# Patient Record
Sex: MALE | Race: BLACK OR AFRICAN AMERICAN | NOT HISPANIC OR LATINO | Employment: STUDENT | URBAN - METROPOLITAN AREA
[De-identification: names, ages, dates, MRNs, and addresses within clinical notes are randomized per-mention and may not be internally consistent; named-entity substitution may affect disease eponyms.]

---
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
## 2024-04-02 ENCOUNTER — OFFICE VISIT (OUTPATIENT)
Dept: FAMILY MEDICINE | Facility: CLINIC | Age: 24
End: 2024-04-02
Payer: COMMERCIAL

## 2024-04-02 VITALS
BODY MASS INDEX: 39.9 KG/M2 | DIASTOLIC BLOOD PRESSURE: 78 MMHG | RESPIRATION RATE: 20 BRPM | HEIGHT: 69 IN | WEIGHT: 269.38 LBS | HEART RATE: 90 BPM | SYSTOLIC BLOOD PRESSURE: 132 MMHG | OXYGEN SATURATION: 98 %

## 2024-04-02 DIAGNOSIS — R53.83 FATIGUE, UNSPECIFIED TYPE: ICD-10-CM

## 2024-04-02 DIAGNOSIS — L02.92 FURUNCLE: ICD-10-CM

## 2024-04-02 DIAGNOSIS — R41.840 ATTENTION DEFICIT: ICD-10-CM

## 2024-04-02 DIAGNOSIS — Z00.00 WELLNESS EXAMINATION: Primary | ICD-10-CM

## 2024-04-02 DIAGNOSIS — L21.9 SEBORRHEIC DERMATITIS: ICD-10-CM

## 2024-04-02 PROCEDURE — 3008F BODY MASS INDEX DOCD: CPT | Mod: CPTII,S$GLB,, | Performed by: FAMILY MEDICINE

## 2024-04-02 PROCEDURE — 1159F MED LIST DOCD IN RCRD: CPT | Mod: CPTII,S$GLB,, | Performed by: FAMILY MEDICINE

## 2024-04-02 PROCEDURE — 90715 TDAP VACCINE 7 YRS/> IM: CPT | Mod: S$GLB,,, | Performed by: FAMILY MEDICINE

## 2024-04-02 PROCEDURE — 99999 PR PBB SHADOW E&M-EST. PATIENT-LVL IV: CPT | Mod: PBBFAC,,, | Performed by: FAMILY MEDICINE

## 2024-04-02 PROCEDURE — 99385 PREV VISIT NEW AGE 18-39: CPT | Mod: 25,S$GLB,, | Performed by: FAMILY MEDICINE

## 2024-04-02 PROCEDURE — 3078F DIAST BP <80 MM HG: CPT | Mod: CPTII,S$GLB,, | Performed by: FAMILY MEDICINE

## 2024-04-02 PROCEDURE — 3075F SYST BP GE 130 - 139MM HG: CPT | Mod: CPTII,S$GLB,, | Performed by: FAMILY MEDICINE

## 2024-04-02 PROCEDURE — 90471 IMMUNIZATION ADMIN: CPT | Mod: S$GLB,,, | Performed by: FAMILY MEDICINE

## 2024-04-02 RX ORDER — ONDANSETRON 4 MG/1
4 TABLET, ORALLY DISINTEGRATING ORAL
COMMUNITY
Start: 2024-02-26 | End: 2024-04-02

## 2024-04-02 RX ORDER — MUPIROCIN 20 MG/G
OINTMENT TOPICAL 3 TIMES DAILY
Qty: 30 G | Refills: 2 | Status: SHIPPED | OUTPATIENT
Start: 2024-04-02

## 2024-04-02 RX ORDER — KETOCONAZOLE 20 MG/ML
SHAMPOO, SUSPENSION TOPICAL
Qty: 120 ML | Refills: 11 | Status: SHIPPED | OUTPATIENT
Start: 2024-04-04

## 2024-04-02 NOTE — PROGRESS NOTES
THIS DOCUMENT WAS MADE IN PART WITH VOICE RECOGNITION SOFTWARE.  OCCASIONALLY THIS SOFTWARE WILL MISINTERPRET WORDS OR PHRASES.    Assessment and Plan:    1. Wellness examination  CBC Auto Differential    Comprehensive Metabolic Panel    Lipid Panel    Hemoglobin A1C    TSH    T4, Free    HIV 1/2 Ag/Ab (4th Gen)    Hepatitis C Antibody    Urinalysis    Urinalysis Microscopic    (In Office Administered) Tdap Vaccine      2. Fatigue, unspecified type  Testosterone    Vitamin B12    Methylmalonic Acid, Serum    Sedimentation rate    MARCELLA Screen w/Reflex      3. Attention deficit  Ambulatory referral/consult to Psychology      4. Seborrheic dermatitis  ketoconazole (NIZORAL) 2 % shampoo      5. Furuncle  mupirocin (BACTROBAN) 2 % ointment          Wellness labs as above     Check labs for fatigue as above   Patient under lot of stress with school projects  Consider sleep study if no positive results on his fatigue labs    Psychology referral for testing for ADHD     Ketoconazole shampoo for seborrheic dermatitis     Recommended hot compress, topical mupirocin for small unimpressive furuncle, follow-up if needs I and D or symptoms worsen    ______________________________________________________________________  Subjective:    Chief Complaint:  Chief Complaint   Patient presents with    Annual Exam     Just a physical, but not establishing care??        HPI:  Carlos Eduardo is a 24 y.o. year old     Patient presents today for establish care/physical     Complains of fatigue for many weeks   Reports being under lot of stress at school.  Denies any significant anxiety or depression   Reports getting plenty of sleep and minimal snoring     Also interested in evaluation for ADD.  His girlfriend reports he has trouble focusing in has some features of ADD.  No prior diagnosis     Noted to have flaking and itching in his scalp.  Has tried over-the-counter products with no significant improvement in symptoms    Complains of left thigh  bump.  Nontender.  No drainage          Past Medical History:  Past Medical History:   Diagnosis Date    Allergy        Past Surgical History:  No past surgical history on file.    Family History:  Family History   Problem Relation Age of Onset    Depression Mother     Diabetes Maternal Grandmother     Hypertension Maternal Grandmother     Early death Paternal Grandfather     Heart disease Paternal Grandfather     Hypertension Paternal Grandfather        Social History:  Social History     Socioeconomic History    Marital status: Single    Number of children: 0   Tobacco Use    Smoking status: Never    Smokeless tobacco: Never   Substance and Sexual Activity    Alcohol use: Yes     Alcohol/week: 1.0 standard drink of alcohol     Types: 1 Glasses of wine per week    Drug use: Never    Sexual activity: Yes     Partners: Female     Birth control/protection: Condom, Other-see comments     Comment: Birth Control Pills   Social History Narrative    School : [employer]     Exercise : None    Diet : normal      Social Determinants of Health     Financial Resource Strain: Low Risk  (4/1/2024)    Overall Financial Resource Strain (CARDIA)     Difficulty of Paying Living Expenses: Not hard at all   Food Insecurity: No Food Insecurity (4/1/2024)    Hunger Vital Sign     Worried About Running Out of Food in the Last Year: Never true     Ran Out of Food in the Last Year: Never true   Transportation Needs: No Transportation Needs (4/1/2024)    PRAPARE - Transportation     Lack of Transportation (Medical): No     Lack of Transportation (Non-Medical): No   Physical Activity: Insufficiently Active (4/1/2024)    Exercise Vital Sign     Days of Exercise per Week: 4 days     Minutes of Exercise per Session: 20 min   Stress: Stress Concern Present (4/1/2024)    [employer] - Occupational Stress Questionnaire     Feeling of Stress : To some extent   Social Connections: Unknown (4/1/2024)    Social  Connection and Isolation Panel [NHANES]     Frequency of Communication with Friends and Family: More than three times a week     Frequency of Social Gatherings with Friends and Family: Once a week     Active Member of Clubs or Organizations: No     Attends Club or Organization Meetings: Never     Marital Status: Living with partner   Housing Stability: Low Risk  (4/1/2024)    Housing Stability Vital Sign     Unable to Pay for Housing in the Last Year: No     Number of Places Lived in the Last Year: 2     Unstable Housing in the Last Year: No       Medications:  Current Outpatient Medications on File Prior to Visit   Medication Sig Dispense Refill    [DISCONTINUED] ondansetron (ZOFRAN-ODT) 4 MG TbDL Take 4 mg by mouth.      cetirizine (ZYRTEC) 10 MG tablet Take 10 mg by mouth once daily.      fexofenadine (ALLEGRA) 180 MG tablet Take 180 mg by mouth once daily.      [DISCONTINUED] diphenhydrAMINE (BENADRYL) spray Apply topically every 4 (four) hours as needed for Itching.      [DISCONTINUED] fexofenadine (ALLEGRA) 180 MG tablet Take 180 mg by mouth once daily.      [DISCONTINUED] montelukast (SINGULAIR) 10 mg tablet Take 10 mg by mouth every evening.       No current facility-administered medications on file prior to visit.       Allergies:  Patient has no known allergies.    Immunizations:  Immunization History   Administered Date(s) Administered    COVID-19, MRNA, LN-S, PF (Pfizer) (Purple Cap) 03/28/2021, 04/18/2021, 10/22/2021    COVID-19, mRNA, LNP-S, bivalent booster, PF (PFIZER OMICRON) 04/14/2023    DTaP 2002, 2002, 2002, 12/09/2003, 05/11/2006    HPV Quadrivalent 02/01/2013, 04/29/2013, 08/07/2013    Hepatitis A, Pediatric/Adolescent, 2 Dose 02/01/2013, 08/07/2013    Hepatitis B 2002, 2002, 2002    Hib-HbOC 2002, 2002, 2002, 05/09/2003    IPV 2002, 2002, 2002, 05/11/2006    Influenza - Intranasal 11/02/2010    Influenza - Quadrivalent  "01/17/2014    Influenza - Quadrivalent - MDCK - PF 09/14/2017    Influenza - Quadrivalent - PF *Preferred* (6 months and older) 10/10/2014, 09/01/2018, 09/14/2019, 10/17/2020, 10/22/2021, 03/03/2023    Influenza - Trivalent (PED) 12/19/2006, 11/08/2007, 11/26/2007, 11/04/2009    Influenza A (H1N1) 2009 Monovalent - IM - PF 11/04/2009, 12/29/2009    MMR 02/19/2003, 05/11/2006    Meningococcal B, OMV 04/19/2018, 05/30/2018    Meningococcal Conjugate (MCV4P) 02/01/2013, 04/19/2018    Pneumococcal Conjugate - 7 Valent 2002, 2002, 2002, 05/09/2003    Tdap 02/01/2013    Varicella 02/19/2003, 03/24/2008       Review of Systems:  Review of Systems   Constitutional:  Negative for fever.   Respiratory:  Negative for cough and shortness of breath.    Cardiovascular:  Negative for chest pain.   Gastrointestinal:  Negative for abdominal pain, diarrhea, nausea and vomiting.   Skin:  Negative for rash.   Psychiatric/Behavioral:  Negative for dysphoric mood.        Objective:    Vitals:  Vitals:    04/02/24 1424   BP: 132/78   Pulse: 90   Resp: 20   SpO2: 98%   Weight: 122.2 kg (269 lb 6.4 oz)   Height: 5' 9" (1.753 m)   PainSc: 0-No pain       Physical Exam  Constitutional:       General: He is not in acute distress.  HENT:      Head: Normocephalic and atraumatic.   Eyes:      Pupils: Pupils are equal, round, and reactive to light.   Cardiovascular:      Rate and Rhythm: Normal rate and regular rhythm.      Heart sounds: No murmur heard.     No friction rub.   Pulmonary:      Effort: Pulmonary effort is normal.      Breath sounds: Normal breath sounds.   Abdominal:      General: Bowel sounds are normal. There is no distension.      Palpations: Abdomen is soft.      Tenderness: There is no abdominal tenderness.   Musculoskeletal:      Cervical back: Neck supple.   Skin:     General: Skin is warm and dry.      Findings: No rash.             Comments: Left thigh-small furuncle, no fluctuance.   Psychiatric:         " Behavior: Behavior normal.             Harry Zuleta MD  Family Medicine

## 2024-04-12 ENCOUNTER — OFFICE VISIT (OUTPATIENT)
Dept: PSYCHIATRY | Facility: CLINIC | Age: 24
End: 2024-04-12
Payer: COMMERCIAL

## 2024-04-12 ENCOUNTER — PATIENT MESSAGE (OUTPATIENT)
Dept: PSYCHIATRY | Facility: CLINIC | Age: 24
End: 2024-04-12
Payer: COMMERCIAL

## 2024-04-12 DIAGNOSIS — Z13.39 ADHD (ATTENTION DEFICIT HYPERACTIVITY DISORDER) EVALUATION: Primary | ICD-10-CM

## 2024-04-12 DIAGNOSIS — R41.840 ATTENTION DEFICIT: ICD-10-CM

## 2024-04-12 PROCEDURE — 90792 PSYCH DIAG EVAL W/MED SRVCS: CPT | Mod: 95,,, | Performed by: PSYCHOLOGIST

## 2024-04-12 NOTE — PROGRESS NOTES
The patient location is: [place], [place]  The chief complaint leading to consultation is: ADHD evaluation  Visit type: Virtual visit with synchronous audio and video  Each patient to whom he or she provides medical services by telemedicine is:  (1) informed of the relationship between the physician and patient and the respective role of any other health care provider with respect to management of the patient; and (2) notified that he or she may decline to receive medical services by telemedicine and may withdraw from such care at any time.  Face-to-Face time: 31 minutes    Notes:      Outpatient Psychiatric Initial Visit  04/12/2024     ID:   Patient presents for an initial evaluation.      Reason for encounter: Referral from Dr. Zuleta     Chief Complaint: ADHD evaluation    History of Presenting Illness:  Pt described an goodc childhood raised by biological mother and maternal grandmother. Pt said that the household was happy and pt was well cared for. Pt denied any trauma or abuse in or outside of the household. Pt denied any mental health issues in childhood and said that he had good friends and did well in school. Pt does recall early difficulties with memory and noted some difficulty with inattention in HS.    Pt went to [employer] for two years and then transferred to [employer]. Pt is still a full-time student and is getting mostly A's and B's. Pt's girlfriend urges him to be evaluated for ADHD. Pt does endorse some symptoms.    Depression symptoms: pt denied current or hx of symptoms     Anxiety symptoms:  Pt denied symptoms consistent with KELLEE, OCD, panic, phobias, or social anxiety.     Nhung/Hypomania Symptoms: Pt denied current or history of related symptoms.    Psychosis Symptoms: Pt denied current or history of related symptoms.    Attention/Concentration Symptoms: Pt reports attention/concentration concerns. Pt explained that some of his symptoms were present before the age of 14 and are  cause impairment in more than one setting. Pt endorsed the following symptoms:    Inattentive:  Often has trouble holding attention on tasks or play activities.  Often does not seem to listen when spoken to directly.  Often does not follow through on instructions and fails to finish schoolwork, chores, or duties in the workplace (e.g., loses focus, side-tracked).  Often has trouble organizing tasks and activities.  Often avoids, dislikes, or is reluctant to do tasks that require mental effort over a long period of time (such as schoolwork or homework).  Often loses things necessary for tasks and activities (e.g. school materials, pencils, books, tools, wallets, keys, paperwork, eyeglasses, mobile telephones).  Is often forgetful in daily activities.  Hyperactive  Often fidgets with or taps hands or feet, or squirms in seat.  Often blurts out an answer before a question has been completed.    Disordered Eating/Body Image Concerns: Pt denied current or history of related symptoms.    Suicidal Ideation and Risk: Pt denied current or history of related symptoms.    Homicidal/Violent Ideation and Risk: Pt denied current or history of related symptoms.    Criminal History: Pt denied.    Prior Psychiatric Treatment/Hospitalizations: Pt denied.     Current psychiatric medication: none    Prior psychiatric medication trials: none    Current Medical Conditions Per Chart Review: There is no problem list on file for this patient.     Family Psychiatric History:  pt denied    Alcohol Use: Pt reported minimal, infrequent alcohol use and denied a history of problematic drinking.    Tobacco and Drug Use: Pt denied cigarette use but said that he uses THC nightly.    Social History:  Pt is dating and living with his girlfriend. Pt is a full-time student at [employer]. Pt exercises infrequently and eats irregularly. Pt drinks minimally and infrequently and denied cigarette use but does use THC nightly.     Trauma history:  pt  denied     Mental Status Exam      Physical Exam  Psychiatric:         Attention and Perception: Attention and perception normal.         Mood and Affect: Mood and affect normal.         Speech: Speech normal.         Behavior: Behavior normal.         Thought Content: Thought content normal. Thought content is not paranoid or delusional. Thought content does not include homicidal or suicidal ideation. Thought content does not include homicidal or suicidal plan.         Judgment: Judgment normal.      Comments: General appearance:  casually groomed, casually dressed    Behavior:  calm, engaged    Demeanor:  pleasant, cooperative    Mood:  euthymic   Affect:  euthymic  Speech:  regular rate, tone and volume    Thought Process:  linear and goal directed    Thought Content:  appropriate - absent of aggressive or self injurious thoughts, feelings or impulses    Insight into Current Situation:  fair    Judgement: fair   Expected Ability to Adhere to Treatment plan: good        Current Evaluation:  Nutritional Screening:  Considering the patient's height and weight, medications, medical history and preferences, should a referral be made to the dietitian? No  Vitals: most recent vitals signs, dated greater than 90 days prior to this appointment, were reviewed  General: age appropriate, well nourished, casually dressed, neatly groomed  MSK: muscle strength/tone : no tremor or abnormal movements. Gait/Station: no ataxic, steady    Clinical Assessment :     Pt reports attention/concentration concerns. Pt explained that some of his symptoms were present before the age of 14 and are cause impairment in more than one setting. Will refer to ADHD testing       Diagnosis(es):   1) R/O ADHD    Plan      Goal #1: Improve attention/concentration    Pt is to follow through with ADHD testing.    Treatment plan and medication changes will be coordinated with PCP, Dr. Zuleta    This author reviewed limits to confidentiality and this  author's collaboration with pt's physician. Pt indicated understanding and denied any questions.    Return to Clinic: after ADHD testing    -Call to report any worsening of symptoms or problems associated with medication  - Pt instructed to go to ER if thoughts of harming self or others arise   Spent 45 minutes face-to-face with patient during evaluation.    -Supportive therapy and psychoeducation provided  -R/B/SE's of medications discussed with the pt who expresses understanding and chooses to take medications as prescribed.   -Pt instructed to call clinic, 911 or go to nearest emergency room if sxs worsen or pt is in   crisis. The pt expresses understanding.

## 2024-05-01 ENCOUNTER — LAB VISIT (OUTPATIENT)
Dept: LAB | Facility: HOSPITAL | Age: 24
End: 2024-05-01
Attending: FAMILY MEDICINE
Payer: COMMERCIAL

## 2024-05-01 DIAGNOSIS — R53.83 FATIGUE, UNSPECIFIED TYPE: ICD-10-CM

## 2024-05-01 DIAGNOSIS — Z00.00 WELLNESS EXAMINATION: ICD-10-CM

## 2024-05-01 LAB
ALBUMIN SERPL BCP-MCNC: 4.1 G/DL (ref 3.5–5.2)
ALP SERPL-CCNC: 130 U/L (ref 55–135)
ALT SERPL W/O P-5'-P-CCNC: 27 U/L (ref 10–44)
ANION GAP SERPL CALC-SCNC: 7 MMOL/L (ref 8–16)
AST SERPL-CCNC: 20 U/L (ref 10–40)
BASOPHILS # BLD AUTO: 0.02 K/UL (ref 0–0.2)
BASOPHILS NFR BLD: 0.3 % (ref 0–1.9)
BILIRUB SERPL-MCNC: 0.9 MG/DL (ref 0.1–1)
BUN SERPL-MCNC: 7 MG/DL (ref 6–20)
CALCIUM SERPL-MCNC: 10.1 MG/DL (ref 8.7–10.5)
CHLORIDE SERPL-SCNC: 106 MMOL/L (ref 95–110)
CHOLEST SERPL-MCNC: 199 MG/DL (ref 120–199)
CHOLEST/HDLC SERPL: 5.9 {RATIO} (ref 2–5)
CO2 SERPL-SCNC: 27 MMOL/L (ref 23–29)
CREAT SERPL-MCNC: 0.9 MG/DL (ref 0.5–1.4)
DIFFERENTIAL METHOD BLD: ABNORMAL
EOSINOPHIL # BLD AUTO: 0 K/UL (ref 0–0.5)
EOSINOPHIL NFR BLD: 0.1 % (ref 0–8)
ERYTHROCYTE [DISTWIDTH] IN BLOOD BY AUTOMATED COUNT: 13.7 % (ref 11.5–14.5)
ERYTHROCYTE [SEDIMENTATION RATE] IN BLOOD BY PHOTOMETRIC METHOD: 21 MM/HR (ref 0–23)
EST. GFR  (NO RACE VARIABLE): >60 ML/MIN/1.73 M^2
ESTIMATED AVG GLUCOSE: 105 MG/DL (ref 68–131)
GLUCOSE SERPL-MCNC: 92 MG/DL (ref 70–110)
HBA1C MFR BLD: 5.3 % (ref 4–5.6)
HCT VFR BLD AUTO: 45.6 % (ref 40–54)
HCV AB SERPL QL IA: NORMAL
HDLC SERPL-MCNC: 34 MG/DL (ref 40–75)
HDLC SERPL: 17.1 % (ref 20–50)
HGB BLD-MCNC: 14.7 G/DL (ref 14–18)
HIV 1+2 AB+HIV1 P24 AG SERPL QL IA: NORMAL
IMM GRANULOCYTES # BLD AUTO: 0.02 K/UL (ref 0–0.04)
IMM GRANULOCYTES NFR BLD AUTO: 0.3 % (ref 0–0.5)
LDLC SERPL CALC-MCNC: 146.6 MG/DL (ref 63–159)
LYMPHOCYTES # BLD AUTO: 1.7 K/UL (ref 1–4.8)
LYMPHOCYTES NFR BLD: 22.5 % (ref 18–48)
MCH RBC QN AUTO: 26.9 PG (ref 27–31)
MCHC RBC AUTO-ENTMCNC: 32.2 G/DL (ref 32–36)
MCV RBC AUTO: 83 FL (ref 82–98)
MONOCYTES # BLD AUTO: 0.4 K/UL (ref 0.3–1)
MONOCYTES NFR BLD: 5.1 % (ref 4–15)
NEUTROPHILS # BLD AUTO: 5.3 K/UL (ref 1.8–7.7)
NEUTROPHILS NFR BLD: 71.7 % (ref 38–73)
NONHDLC SERPL-MCNC: 165 MG/DL
NRBC BLD-RTO: 0 /100 WBC
PLATELET # BLD AUTO: 354 K/UL (ref 150–450)
PMV BLD AUTO: 11.2 FL (ref 9.2–12.9)
POTASSIUM SERPL-SCNC: 4.9 MMOL/L (ref 3.5–5.1)
PROT SERPL-MCNC: 7.4 G/DL (ref 6–8.4)
RBC # BLD AUTO: 5.47 M/UL (ref 4.6–6.2)
SODIUM SERPL-SCNC: 140 MMOL/L (ref 136–145)
T4 FREE SERPL-MCNC: 0.93 NG/DL (ref 0.71–1.51)
TESTOST SERPL-MCNC: 412 NG/DL (ref 304–1227)
TRIGL SERPL-MCNC: 92 MG/DL (ref 30–150)
TSH SERPL DL<=0.005 MIU/L-ACNC: 1.89 UIU/ML (ref 0.4–4)
VIT B12 SERPL-MCNC: 451 PG/ML (ref 210–950)
WBC # BLD AUTO: 7.39 K/UL (ref 3.9–12.7)

## 2024-05-01 PROCEDURE — 86038 ANTINUCLEAR ANTIBODIES: CPT | Performed by: FAMILY MEDICINE

## 2024-05-01 PROCEDURE — 84439 ASSAY OF FREE THYROXINE: CPT | Performed by: FAMILY MEDICINE

## 2024-05-01 PROCEDURE — 83921 ORGANIC ACID SINGLE QUANT: CPT | Performed by: FAMILY MEDICINE

## 2024-05-01 PROCEDURE — 82607 VITAMIN B-12: CPT | Performed by: FAMILY MEDICINE

## 2024-05-01 PROCEDURE — 85652 RBC SED RATE AUTOMATED: CPT | Performed by: FAMILY MEDICINE

## 2024-05-01 PROCEDURE — 87389 HIV-1 AG W/HIV-1&-2 AB AG IA: CPT | Performed by: FAMILY MEDICINE

## 2024-05-01 PROCEDURE — 86803 HEPATITIS C AB TEST: CPT | Performed by: FAMILY MEDICINE

## 2024-05-01 PROCEDURE — 80061 LIPID PANEL: CPT | Performed by: FAMILY MEDICINE

## 2024-05-01 PROCEDURE — 80053 COMPREHEN METABOLIC PANEL: CPT | Performed by: FAMILY MEDICINE

## 2024-05-01 PROCEDURE — 84403 ASSAY OF TOTAL TESTOSTERONE: CPT | Performed by: FAMILY MEDICINE

## 2024-05-01 PROCEDURE — 84443 ASSAY THYROID STIM HORMONE: CPT | Performed by: FAMILY MEDICINE

## 2024-05-01 PROCEDURE — 36415 COLL VENOUS BLD VENIPUNCTURE: CPT | Mod: PN | Performed by: FAMILY MEDICINE

## 2024-05-01 PROCEDURE — 85025 COMPLETE CBC W/AUTO DIFF WBC: CPT | Performed by: FAMILY MEDICINE

## 2024-05-01 PROCEDURE — 83036 HEMOGLOBIN GLYCOSYLATED A1C: CPT | Performed by: FAMILY MEDICINE

## 2024-05-02 LAB — ANA SER QL IF: NORMAL

## 2024-05-06 LAB — METHYLMALONATE SERPL-SCNC: 0.14 UMOL/L

## 2024-05-17 ENCOUNTER — OFFICE VISIT (OUTPATIENT)
Dept: FAMILY MEDICINE | Facility: CLINIC | Age: 24
End: 2024-05-17
Payer: COMMERCIAL

## 2024-05-17 VITALS
RESPIRATION RATE: 18 BRPM | SYSTOLIC BLOOD PRESSURE: 116 MMHG | HEIGHT: 69 IN | DIASTOLIC BLOOD PRESSURE: 72 MMHG | WEIGHT: 265.56 LBS | OXYGEN SATURATION: 99 % | HEART RATE: 81 BPM | BODY MASS INDEX: 39.33 KG/M2

## 2024-05-17 DIAGNOSIS — R41.840 ATTENTION DEFICIT: Primary | ICD-10-CM

## 2024-05-17 PROCEDURE — 1160F RVW MEDS BY RX/DR IN RCRD: CPT | Mod: CPTII,S$GLB,, | Performed by: FAMILY MEDICINE

## 2024-05-17 PROCEDURE — 99214 OFFICE O/P EST MOD 30 MIN: CPT | Mod: S$GLB,,, | Performed by: FAMILY MEDICINE

## 2024-05-17 PROCEDURE — 1159F MED LIST DOCD IN RCRD: CPT | Mod: CPTII,S$GLB,, | Performed by: FAMILY MEDICINE

## 2024-05-17 PROCEDURE — 3074F SYST BP LT 130 MM HG: CPT | Mod: CPTII,S$GLB,, | Performed by: FAMILY MEDICINE

## 2024-05-17 PROCEDURE — 3078F DIAST BP <80 MM HG: CPT | Mod: CPTII,S$GLB,, | Performed by: FAMILY MEDICINE

## 2024-05-17 PROCEDURE — 99999 PR PBB SHADOW E&M-EST. PATIENT-LVL III: CPT | Mod: PBBFAC,,, | Performed by: FAMILY MEDICINE

## 2024-05-17 PROCEDURE — 3044F HG A1C LEVEL LT 7.0%: CPT | Mod: CPTII,S$GLB,, | Performed by: FAMILY MEDICINE

## 2024-05-17 PROCEDURE — 3008F BODY MASS INDEX DOCD: CPT | Mod: CPTII,S$GLB,, | Performed by: FAMILY MEDICINE

## 2024-05-17 RX ORDER — DEXTROAMPHETAMINE SACCHARATE, AMPHETAMINE ASPARTATE MONOHYDRATE, DEXTROAMPHETAMINE SULFATE AND AMPHETAMINE SULFATE 5; 5; 5; 5 MG/1; MG/1; MG/1; MG/1
20 CAPSULE, EXTENDED RELEASE ORAL EVERY MORNING
Qty: 30 CAPSULE | Refills: 0 | Status: SHIPPED | OUTPATIENT
Start: 2024-05-17 | End: 2024-05-21 | Stop reason: SDUPTHER

## 2024-05-17 NOTE — PROGRESS NOTES
THIS DOCUMENT WAS MADE IN PART WITH VOICE RECOGNITION SOFTWARE.  OCCASIONALLY THIS SOFTWARE WILL MISINTERPRET WORDS OR PHRASES.    Assessment and Plan:    1. Attention deficit  dextroamphetamine-amphetamine (ADDERALL XR) 20 MG 24 hr capsule          PLAN    New medication Adderall 20 mg extended release   Follow-up in 1 month   Counseled on potential side effects     Seborrheic dermatitis resolve with medicine     Will monitor fatigue, slightly improved   May improve with stimulant      ______________________________________________________________________  Subjective:    Chief Complaint:  Chief Complaint   Patient presents with    Follow-up        HPI:  Carlos Eduardo is a 24 y.o. year old         Follow-up seborrheic dermatitis   Rash resolved    Follow-up attention deficit   Tested positive with Psychology   No prior treatment   Interested in medical therapy    Follow-up fatigue  Slightly improved   Lab work unremarkable    Past Medical History:  Past Medical History:   Diagnosis Date    Allergy        Past Surgical History:  No past surgical history on file.    Family History:  Family History   Problem Relation Name Age of Onset    Depression Mother Estela Zapata     Diabetes Maternal Grandmother Jeffrey Zapata     Hypertension Maternal Grandmother Jeffrey Zapata     Early death Paternal Grandfather Manuel Zapata     Heart disease Paternal Grandfather Manuel Zapata     Hypertension Paternal Grandfather Manuel Zapata        Social History:  Social History     Socioeconomic History    Marital status: Single    Number of children: 0   Tobacco Use    Smoking status: Never    Smokeless tobacco: Never   Substance and Sexual Activity    Alcohol use: Yes     Alcohol/week: 1.0 standard drink of alcohol     Types: 1 Glasses of wine per week    Drug use: Never    Sexual activity: Yes     Partners: Female     Birth control/protection: Condom, Other-see comments     Comment: Birth Control Pills   Social History Narrative    School : [employer] :  [employer]     Exercise : None    Diet : normal      Social Determinants of Health     Financial Resource Strain: Low Risk  (4/1/2024)    Overall Financial Resource Strain (CARDIA)     Difficulty of Paying Living Expenses: Not hard at all   Food Insecurity: No Food Insecurity (4/1/2024)    Hunger Vital Sign     Worried About Running Out of Food in the Last Year: Never true     Ran Out of Food in the Last Year: Never true   Transportation Needs: No Transportation Needs (4/1/2024)    PRAPARE - Transportation     Lack of Transportation (Medical): No     Lack of Transportation (Non-Medical): No   Physical Activity: Insufficiently Active (4/1/2024)    Exercise Vital Sign     Days of Exercise per Week: 4 days     Minutes of Exercise per Session: 20 min   Stress: Stress Concern Present (4/1/2024)    [facility] - Occupational Stress Questionnaire     Feeling of Stress : To some extent   Housing Stability: Low Risk  (4/1/2024)    Housing Stability Vital Sign     Unable to Pay for Housing in the Last Year: No     Number of Places Lived in the Last Year: 2     Unstable Housing in the Last Year: No       Medications:  Current Outpatient Medications on File Prior to Visit   Medication Sig Dispense Refill    cetirizine (ZYRTEC) 10 MG tablet Take 10 mg by mouth once daily.      fexofenadine (ALLEGRA) 180 MG tablet Take 180 mg by mouth once daily.      ketoconazole (NIZORAL) 2 % shampoo Apply topically twice a week. (Patient not taking: Reported on 5/17/2024) 120 mL 11    mupirocin (BACTROBAN) 2 % ointment Apply topically 3 (three) times daily. (Patient not taking: Reported on 5/17/2024) 30 g 2     No current facility-administered medications on file prior to visit.       Allergies:  Patient has no known allergies.    Immunizations:  Immunization History   Administered Date(s) Administered    COVID-19, MRNA, LN-S, PF (Pfizer) (Purple Cap) 03/28/2021, 04/18/2021    DTaP 2002, 2002,  "2002, 12/09/2003, 05/11/2006    HPV Quadrivalent 02/01/2013, 04/29/2013, 08/07/2013    Hepatitis A, Pediatric/Adolescent, 2 Dose 02/01/2013, 08/07/2013    Hepatitis B 2002, 2002, 2002    Hib-HbOC 2002, 2002, 2002, 05/09/2003    IPV 2002, 2002, 2002, 05/11/2006    Influenza - Intranasal 11/02/2010    Influenza - Quadrivalent 01/17/2014    Influenza - Quadrivalent - MDCK - PF 09/14/2017    Influenza - Quadrivalent - PF *Preferred* (6 months and older) 10/10/2014, 09/01/2018, 09/14/2019, 10/17/2020, 10/22/2021, 03/03/2023    Influenza - Trivalent (PED) 12/19/2006, 11/08/2007, 11/26/2007, 11/04/2009    Influenza A (H1N1) 2009 Monovalent - IM - PF 11/04/2009, 12/29/2009    MMR 02/19/2003, 05/11/2006    Meningococcal B, OMV 04/19/2018, 05/30/2018    Meningococcal Conjugate (MCV4P) 02/01/2013, 04/19/2018    Pneumococcal Conjugate - 7 Valent 2002, 2002, 2002, 05/09/2003    Tdap 02/01/2013, 04/02/2024    Varicella 02/19/2003, 03/24/2008       Review of Systems:  Review of Systems   All other systems reviewed and are negative.      Objective:    Vitals:  Vitals:    05/17/24 1156   BP: (!) 142/88   Pulse: 81   Resp: 18   SpO2: 99%   Weight: 120.5 kg (265 lb 8.7 oz)   Height: 5' 9" (1.753 m)   PainSc: 0-No pain       Physical Exam  Vitals reviewed.   Constitutional:       General: He is not in acute distress.  HENT:      Head: Normocephalic and atraumatic.   Eyes:      Pupils: Pupils are equal, round, and reactive to light.   Cardiovascular:      Rate and Rhythm: Normal rate and regular rhythm.      Heart sounds: No murmur heard.     No friction rub.   Pulmonary:      Effort: Pulmonary effort is normal.      Breath sounds: Normal breath sounds.   Abdominal:      General: Bowel sounds are normal. There is no distension.      Palpations: Abdomen is soft.      Tenderness: There is no abdominal tenderness.   Musculoskeletal:      Cervical back: Neck " supple.   Skin:     General: Skin is warm and dry.      Findings: No rash.   Psychiatric:         Behavior: Behavior normal.             Harry Zuleta MD  Family Medicine

## 2024-05-19 ENCOUNTER — PATIENT MESSAGE (OUTPATIENT)
Dept: FAMILY MEDICINE | Facility: CLINIC | Age: 24
End: 2024-05-19
Payer: COMMERCIAL

## 2024-05-19 DIAGNOSIS — F90.0 ATTENTION DEFICIT HYPERACTIVITY DISORDER (ADHD), PREDOMINANTLY INATTENTIVE TYPE: Primary | ICD-10-CM

## 2024-05-19 DIAGNOSIS — R41.840 ATTENTION DEFICIT: ICD-10-CM

## 2024-05-20 ENCOUNTER — TELEPHONE (OUTPATIENT)
Dept: FAMILY MEDICINE | Facility: CLINIC | Age: 24
End: 2024-05-20
Payer: COMMERCIAL

## 2024-05-20 NOTE — TELEPHONE ENCOUNTER
Spoke with [facility] pharmacy, pharmacy stated that have to use F.90 for a diagnostic code for attention deficit for Medicaid to cover his prescription.

## 2024-05-21 RX ORDER — DEXTROAMPHETAMINE SACCHARATE, AMPHETAMINE ASPARTATE MONOHYDRATE, DEXTROAMPHETAMINE SULFATE AND AMPHETAMINE SULFATE 5; 5; 5; 5 MG/1; MG/1; MG/1; MG/1
20 CAPSULE, EXTENDED RELEASE ORAL EVERY MORNING
Qty: 30 CAPSULE | Refills: 0 | Status: SHIPPED | OUTPATIENT
Start: 2024-05-21

## 2024-05-21 NOTE — TELEPHONE ENCOUNTER
No care due was identified.  [facility] Embedded Care Due Messages. Reference number: [id number].   5/21/2024 9:28:51 AM CDT

## 2024-06-21 ENCOUNTER — OFFICE VISIT (OUTPATIENT)
Dept: FAMILY MEDICINE | Facility: CLINIC | Age: 24
End: 2024-06-21
Payer: COMMERCIAL

## 2024-06-21 ENCOUNTER — TELEPHONE (OUTPATIENT)
Dept: FAMILY MEDICINE | Facility: CLINIC | Age: 24
End: 2024-06-21

## 2024-06-21 DIAGNOSIS — Z13.39 ADHD (ATTENTION DEFICIT HYPERACTIVITY DISORDER) EVALUATION: Primary | ICD-10-CM

## 2024-06-21 NOTE — PROGRESS NOTES
THIS DOCUMENT WAS MADE IN PART WITH VOICE RECOGNITION SOFTWARE.  OCCASIONALLY THIS SOFTWARE WILL MISINTERPRET WORDS OR PHRASES.    Assessment and Plan:    1. ADHD (attention deficit hyperactivity disorder) evaluation            PLAN    Medicine working well, follow-up every 3 months            ______________________________________________________________________  Subjective:    Chief Complaint:  ADD     HPI:  Carlos Eduardo is a 24 y.o. year old     The patient location is: [place]  The chief complaint leading to consultation is: ADd    Visit type: audiovisual    Face to Face time with patient: 13   20 minutes of total time spent on the encounter, which includes face to face time and non-face to face time preparing to see the patient (eg, review of tests), Obtaining and/or reviewing separately obtained history, Documenting clinical information in the electronic or other health record, Independently interpreting results (not separately reported) and communicating results to the patient/family/caregiver, or Care coordination (not separately reported).         Each patient to whom he or she provides medical services by telemedicine is:  (1) informed of the relationship between the physician and patient and the respective role of any other health care provider with respect to management of the patient; and (2) notified that he or she may decline to receive medical services by telemedicine and may withdraw from such care at any time.    Notes:       Follow-up ADD   New medication Adderall 20 mg extended release prescribed approximately 1 month ago, new medication   Energy improved  Focus improved      ADD   Rx-Adderall 20 mg XR    Past Medical History:  Past Medical History:   Diagnosis Date    Allergy        Past Surgical History:  No past surgical history on file.    Family History:  Family History   Problem Relation Name Age of Onset    Depression Mother Estela Zapata     Diabetes Maternal Grandmother Jeffrey Zapata      Hypertension Maternal Grandmother Jeffrey Zapata     Early death Paternal Grandfather Manuel Zapata     Heart disease Paternal Grandfather Manuel Zapata     Hypertension Paternal Grandfather Manuel Zapata        Social History:  Social History     Socioeconomic History    Marital status: Single    Number of children: 0   Tobacco Use    Smoking status: Never    Smokeless tobacco: Never   Substance and Sexual Activity    Alcohol use: Yes     Alcohol/week: 1.0 standard drink of alcohol     Types: 1 Glasses of wine per week    Drug use: Never    Sexual activity: Yes     Partners: Female     Birth control/protection: Condom, Other-see comments     Comment: Birth Control Pills   Social History Narrative    School : [employer] : [employer]     Exercise : None    Diet : normal      Social Determinants of Health     Financial Resource Strain: Low Risk  (4/1/2024)    Overall Financial Resource Strain (CARDIA)     Difficulty of Paying Living Expenses: Not hard at all   Food Insecurity: No Food Insecurity (4/1/2024)    Hunger Vital Sign     Worried About Running Out of Food in the Last Year: Never true     Ran Out of Food in the Last Year: Never true   Transportation Needs: No Transportation Needs (4/1/2024)    PRAPARE - Transportation     Lack of Transportation (Medical): No     Lack of Transportation (Non-Medical): No   Physical Activity: Insufficiently Active (4/1/2024)    Exercise Vital Sign     Days of Exercise per Week: 4 days     Minutes of Exercise per Session: 20 min   Stress: Stress Concern Present (4/1/2024)    [facility] - Occupational Stress Questionnaire     Feeling of Stress : To some extent   Housing Stability: Low Risk  (4/1/2024)    Housing Stability Vital Sign     Unable to Pay for Housing in the Last Year: No     Number of Places Lived in the Last Year: 2     Unstable Housing in the Last Year: No       Medications:  Current Outpatient Medications on File Prior to Visit   Medication Sig  Dispense Refill    cetirizine (ZYRTEC) 10 MG tablet Take 10 mg by mouth once daily.      dextroamphetamine-amphetamine (ADDERALL XR) 20 MG 24 hr capsule Take 1 capsule (20 mg total) by mouth every morning. 30 capsule 0    fexofenadine (ALLEGRA) 180 MG tablet Take 180 mg by mouth once daily.      ketoconazole (NIZORAL) 2 % shampoo Apply topically twice a week. (Patient not taking: Reported on 5/17/2024) 120 mL 11    mupirocin (BACTROBAN) 2 % ointment Apply topically 3 (three) times daily. (Patient not taking: Reported on 5/17/2024) 30 g 2     No current facility-administered medications on file prior to visit.       Allergies:  Patient has no known allergies.    Immunizations:  Immunization History   Administered Date(s) Administered    COVID-19, MRNA, LN-S, PF (Pfizer) (Purple Cap) 03/28/2021, 04/18/2021    DTaP 2002, 2002, 2002, 12/09/2003, 05/11/2006    HPV Quadrivalent 02/01/2013, 04/29/2013, 08/07/2013    Hepatitis A, Pediatric/Adolescent, 2 Dose 02/01/2013, 08/07/2013    Hepatitis B 2002, 2002, 2002    Hib-HbOC 2002, 2002, 2002, 05/09/2003    IPV 2002, 2002, 2002, 05/11/2006    Influenza - Intranasal 11/02/2010    Influenza - Quadrivalent 01/17/2014    Influenza - Quadrivalent - MDCK - PF 09/14/2017    Influenza - Quadrivalent - PF *Preferred* (6 months and older) 10/10/2014, 09/01/2018, 09/14/2019, 10/17/2020, 10/22/2021, 03/03/2023    Influenza - Trivalent (PED) 12/19/2006, 11/08/2007, 11/26/2007, 11/04/2009    Influenza A (H1N1) 2009 Monovalent - IM - PF 11/04/2009, 12/29/2009    MMR 02/19/2003, 05/11/2006    Meningococcal B, OMV 04/19/2018, 05/30/2018    Meningococcal Conjugate (MCV4P) 02/01/2013, 04/19/2018    Pneumococcal Conjugate - 7 Valent 2002, 2002, 2002, 05/09/2003    Tdap 02/01/2013, 04/02/2024    Varicella 02/19/2003, 03/24/2008       Review of Systems:  Review of Systems   Constitutional:  Positive for  activity change. Negative for unexpected weight change.   HENT:  Negative for hearing loss, rhinorrhea and trouble swallowing.    Eyes:  Negative for discharge and visual disturbance.   Respiratory:  Negative for chest tightness and wheezing.    Cardiovascular:  Negative for chest pain and palpitations.   Gastrointestinal:  Negative for blood in stool, constipation, diarrhea and vomiting.   Endocrine: Negative for polydipsia and polyuria.   Genitourinary:  Negative for difficulty urinating, hematuria and urgency.   Musculoskeletal:  Negative for arthralgias, joint swelling and neck pain.   Neurological:  Negative for weakness and headaches.   Psychiatric/Behavioral:  Negative for confusion and dysphoric mood.    All other systems reviewed and are negative.      Objective:    Vitals:  There were no vitals filed for this visit.      Physical Exam  Vitals reviewed.   Constitutional:       General: He is not in acute distress.  HENT:      Head: Normocephalic and atraumatic.   Eyes:      Pupils: Pupils are equal, round, and reactive to light.   Cardiovascular:      Rate and Rhythm: Normal rate and regular rhythm.      Heart sounds: No murmur heard.     No friction rub.   Pulmonary:      Effort: Pulmonary effort is normal.      Breath sounds: Normal breath sounds.   Abdominal:      General: Bowel sounds are normal. There is no distension.      Palpations: Abdomen is soft.      Tenderness: There is no abdominal tenderness.   Musculoskeletal:      Cervical back: Neck supple.   Skin:     General: Skin is warm and dry.      Findings: No rash.   Psychiatric:         Behavior: Behavior normal.             Harry Zuleta MD  Family Medicine

## 2024-06-21 NOTE — TELEPHONE ENCOUNTER
----- Message from Harry Zuleta MD sent at 6/21/2024  1:04 PM CDT -----  Call patient, schedule follow-up visit virtual in 3 months

## 2024-06-27 DIAGNOSIS — R41.840 ATTENTION DEFICIT: ICD-10-CM

## 2024-06-27 NOTE — TELEPHONE ENCOUNTER
No care due was identified.  [facility] Embedded Care Due Messages. Reference number: [id number].   6/27/2024 5:26:48 PM CDT

## 2024-06-28 RX ORDER — DEXTROAMPHETAMINE SACCHARATE, AMPHETAMINE ASPARTATE MONOHYDRATE, DEXTROAMPHETAMINE SULFATE AND AMPHETAMINE SULFATE 5; 5; 5; 5 MG/1; MG/1; MG/1; MG/1
20 CAPSULE, EXTENDED RELEASE ORAL EVERY MORNING
Qty: 30 CAPSULE | Refills: 0 | Status: SHIPPED | OUTPATIENT
Start: 2024-06-28

## 2024-07-12 RX ORDER — MINERAL OIL
180 ENEMA (ML) RECTAL DAILY
Qty: 90 TABLET | Refills: 1 | Status: CANCELLED | OUTPATIENT
Start: 2024-07-12

## 2024-07-12 NOTE — TELEPHONE ENCOUNTER
No care due was identified.  [facility] Embedded Care Due Messages. Reference number: [id number].   7/12/2024 8:21:35 AM CDT

## 2024-07-12 NOTE — TELEPHONE ENCOUNTER
----- Message from Talisha Yadav sent at 7/12/2024  8:08 AM CDT -----  Type:  RX Refill Request    Who Called:  Pt's mom Estela    Refill or New Rx:  new/refill 2 medications    RX Name and Strength:    fexofenadine (ALLEGRA) 180 MG tablet  Montelukast     How is the patient currently taking it? (ex. 1XDay):  as directed    Is this a 30 day or 90 day RX:  90    Preferred Pharmacy with phone number:    [id number] - [place] - [address]  [address]  [place] [address]  Phone: [phone] Fax: [phone]    Local or Mail Order:  Local    Ordering Provider:  Dr Ruiz    Would the patient rather a call back or a response via [facility]?  Call back    Best Call Back Number:  [phone]  [phone]    Additional Information:  They will be leaving the Monday/Tuesday to go on vacation and is asking that Dr Zuleta fill his allergy medications.   Please call back to advise. Thanks!

## 2024-08-06 DIAGNOSIS — R41.840 ATTENTION DEFICIT: ICD-10-CM

## 2024-08-07 RX ORDER — DEXTROAMPHETAMINE SACCHARATE, AMPHETAMINE ASPARTATE MONOHYDRATE, DEXTROAMPHETAMINE SULFATE AND AMPHETAMINE SULFATE 5; 5; 5; 5 MG/1; MG/1; MG/1; MG/1
20 CAPSULE, EXTENDED RELEASE ORAL EVERY MORNING
Qty: 30 CAPSULE | Refills: 0 | Status: SHIPPED | OUTPATIENT
Start: 2024-08-07

## 2024-09-16 ENCOUNTER — OFFICE VISIT (OUTPATIENT)
Dept: FAMILY MEDICINE | Facility: CLINIC | Age: 24
End: 2024-09-16
Payer: COMMERCIAL

## 2024-09-16 DIAGNOSIS — R41.840 ATTENTION DEFICIT: ICD-10-CM

## 2024-09-16 DIAGNOSIS — F98.8 ATTENTION DEFICIT DISORDER (ADD) IN ADULT: Primary | ICD-10-CM

## 2024-09-16 PROCEDURE — 99213 OFFICE O/P EST LOW 20 MIN: CPT | Mod: 95,,, | Performed by: FAMILY MEDICINE

## 2024-09-16 PROCEDURE — 3044F HG A1C LEVEL LT 7.0%: CPT | Mod: CPTII,95,, | Performed by: FAMILY MEDICINE

## 2024-09-16 RX ORDER — DEXTROAMPHETAMINE SACCHARATE, AMPHETAMINE ASPARTATE MONOHYDRATE, DEXTROAMPHETAMINE SULFATE AND AMPHETAMINE SULFATE 5; 5; 5; 5 MG/1; MG/1; MG/1; MG/1
20 CAPSULE, EXTENDED RELEASE ORAL EVERY MORNING
Qty: 30 CAPSULE | Refills: 0 | Status: SHIPPED | OUTPATIENT
Start: 2024-09-16

## 2024-09-16 NOTE — PROGRESS NOTES
THIS DOCUMENT WAS MADE IN PART WITH VOICE RECOGNITION SOFTWARE.  OCCASIONALLY THIS SOFTWARE WILL MISINTERPRET WORDS OR PHRASES.    Assessment and Plan:    1. Attention deficit disorder (ADD) in adult  dextroamphetamine-amphetamine (ADDERALL XR) 20 MG 24 hr capsule      2. Attention deficit                PLAN    Medicine working well, follow-up every 3 months            ______________________________________________________________________  Subjective:    Chief Complaint:  ADD     HPI:  Carlos Eduardo is a 24 y.o. year old     The patient location is: [place]  The chief complaint leading to consultation is: ADd    Visit type: audiovisual    Face to Face time with patient: 13   20 minutes of total time spent on the encounter, which includes face to face time and non-face to face time preparing to see the patient (eg, review of tests), Obtaining and/or reviewing separately obtained history, Documenting clinical information in the electronic or other health record, Independently interpreting results (not separately reported) and communicating results to the patient/family/caregiver, or Care coordination (not separately reported).         Each patient to whom he or she provides medical services by telemedicine is:  (1) informed of the relationship between the physician and patient and the respective role of any other health care provider with respect to management of the patient; and (2) notified that he or she may decline to receive medical services by telemedicine and may withdraw from such care at any time.    Notes:       History of Present Illness    CHIEF COMPLAINT:  Carlos Eduardo presents today for follow up monitoring of ADHD medication.    ATTENTION DEFICIT DISORDER MANAGEMENT:  He reports continued effectiveness of Adderall 20 mg extended release for ADHD management. The current dosage provides increased energy and focus. He denies experiencing any negative side effects and does not feel a need to change the medication dose at  this time.    PRESCRIPTION ISSUES:  He mentions difficulty at the pharmacy when picking up his prescription, stating that there was an issue with the prescription code.      ROS:  General: -fever, -chills, -fatigue, -weight gain, -weight loss  Eyes: -vision changes, -redness, -discharge  ENT: -ear pain, -nasal congestion, -sore throat  Cardiovascular: -chest pain, -palpitations, -lower extremity edema  Respiratory: -cough, -shortness of breath  Gastrointestinal: -abdominal pain, -nausea, -vomiting, -diarrhea, -constipation, -blood in stool  Genitourinary: -dysuria, -hematuria, -frequency  Musculoskeletal: -joint pain, -muscle pain  Skin: -rash, -lesion  Neurological: -headache, -dizziness, -numbness, -tingling  Psychiatric: -anxiety, -depression, -sleep difficulty  Allergic: -allergic reactions             ADD   Rx-Adderall 20 mg XR    Past Medical History:  Past Medical History:   Diagnosis Date    Allergy        Past Surgical History:  No past surgical history on file.    Family History:  Family History   Problem Relation Name Age of Onset    Depression Mother Estela Zapata     Diabetes Maternal Grandmother Jeffrey Zapata     Hypertension Maternal Grandmother Jeffrey Zapata     Early death Paternal Grandfather Manuel Zapata     Heart disease Paternal Grandfather Manuel Zapata     Hypertension Paternal Grandfather Manuel Zapata        Social History:  Social History     Socioeconomic History    Marital status: Single    Number of children: 0   Tobacco Use    Smoking status: Never    Smokeless tobacco: Never   Substance and Sexual Activity    Alcohol use: Yes     Alcohol/week: 1.0 standard drink of alcohol     Types: 1 Glasses of wine per week    Drug use: Never    Sexual activity: Yes     Partners: Female     Birth control/protection: Condom, Other-see comments     Comment: Birth Control Pills   Social History Narrative    School : [employer]     Exercise : None    Diet : normal      Social Determinants of Health      Financial Resource Strain: Low Risk  (4/1/2024)    Overall Financial Resource Strain (CARDIA)     Difficulty of Paying Living Expenses: Not hard at all   Food Insecurity: No Food Insecurity (4/1/2024)    Hunger Vital Sign     Worried About Running Out of Food in the Last Year: Never true     Ran Out of Food in the Last Year: Never true   Transportation Needs: No Transportation Needs (4/1/2024)    PRAPARE - Transportation     Lack of Transportation (Medical): No     Lack of Transportation (Non-Medical): No   Physical Activity: Insufficiently Active (4/1/2024)    Exercise Vital Sign     Days of Exercise per Week: 4 days     Minutes of Exercise per Session: 20 min   Stress: Stress Concern Present (4/1/2024)    [employer] [place] [employer] - Occupational Stress Questionnaire     Feeling of Stress : To some extent   Housing Stability: Low Risk  (4/1/2024)    Housing Stability Vital Sign     Unable to Pay for Housing in the Last Year: No     Number of Places Lived in the Last Year: 2     Unstable Housing in the Last Year: No       Medications:  Current Outpatient Medications on File Prior to Visit   Medication Sig Dispense Refill    cetirizine (ZYRTEC) 10 MG tablet Take 10 mg by mouth once daily.      fexofenadine (ALLEGRA) 180 MG tablet Take 180 mg by mouth once daily.      [DISCONTINUED] dextroamphetamine-amphetamine (ADDERALL XR) 20 MG 24 hr capsule Take 1 capsule (20 mg total) by mouth every morning. 30 capsule 0    [DISCONTINUED] ketoconazole (NIZORAL) 2 % shampoo Apply topically twice a week. (Patient not taking: Reported on 5/17/2024) 120 mL 11    [DISCONTINUED] mupirocin (BACTROBAN) 2 % ointment Apply topically 3 (three) times daily. (Patient not taking: Reported on 5/17/2024) 30 g 2     No current facility-administered medications on file prior to visit.       Allergies:  Patient has no known allergies.    Immunizations:  Immunization History   Administered Date(s) Administered    COVID-19, MRNA,  LN-S, PF (Pfizer) (Purple Cap) 03/28/2021, 04/18/2021    DTaP 2002, 2002, 2002, 12/09/2003, 05/11/2006    HPV Quadrivalent 02/01/2013, 04/29/2013, 08/07/2013    Hepatitis A, Pediatric/Adolescent, 2 Dose 02/01/2013, 08/07/2013    Hepatitis B 2002, 2002, 2002    Hib-HbOC 2002, 2002, 2002, 05/09/2003    IPV 2002, 2002, 2002, 05/11/2006    Influenza - Intranasal 11/02/2010    Influenza - Quadrivalent 01/17/2014    Influenza - Quadrivalent - MDCK - PF 09/14/2017    Influenza - Quadrivalent - PF *Preferred* (6 months and older) 10/10/2014, 09/01/2018, 09/14/2019, 10/17/2020, 10/22/2021, 03/03/2023    Influenza - Trivalent (PED) 12/19/2006, 11/08/2007, 11/26/2007, 11/04/2009    Influenza A (H1N1) 2009 Monovalent - IM - PF 11/04/2009, 12/29/2009    MMR 02/19/2003, 05/11/2006    Meningococcal B, OMV 04/19/2018, 05/30/2018    Meningococcal Conjugate (MCV4P) 02/01/2013, 04/19/2018    Pneumococcal Conjugate - 7 Valent 2002, 2002, 2002, 05/09/2003    Tdap 02/01/2013, 04/02/2024    Varicella 02/19/2003, 03/24/2008       Review of Systems:  Review of Systems   Constitutional:  Positive for activity change. Negative for unexpected weight change.   HENT:  Negative for hearing loss, rhinorrhea and trouble swallowing.    Eyes:  Negative for discharge and visual disturbance.   Respiratory:  Negative for chest tightness and wheezing.    Cardiovascular:  Negative for chest pain and palpitations.   Gastrointestinal:  Negative for blood in stool, constipation, diarrhea and vomiting.   Endocrine: Negative for polydipsia and polyuria.   Genitourinary:  Negative for difficulty urinating, hematuria and urgency.   Musculoskeletal:  Negative for arthralgias, joint swelling and neck pain.   Neurological:  Negative for weakness and headaches.   Psychiatric/Behavioral:  Negative for confusion and dysphoric mood.    All other systems reviewed and are  negative.      Objective:    Vitals:  There were no vitals filed for this visit.      Physical Exam  Vitals reviewed.   Constitutional:       General: He is not in acute distress.  HENT:      Head: Normocephalic and atraumatic.   Eyes:      Pupils: Pupils are equal, round, and reactive to light.   Cardiovascular:      Rate and Rhythm: Normal rate and regular rhythm.      Heart sounds: No murmur heard.     No friction rub.   Pulmonary:      Effort: Pulmonary effort is normal.      Breath sounds: Normal breath sounds.   Abdominal:      General: Bowel sounds are normal. There is no distension.      Palpations: Abdomen is soft.      Tenderness: There is no abdominal tenderness.   Musculoskeletal:      Cervical back: Neck supple.   Skin:     General: Skin is warm and dry.      Findings: No rash.   Psychiatric:         Behavior: Behavior normal.             Harry Zuleta MD  Family Medicine

## 2024-10-27 DIAGNOSIS — F98.8 ATTENTION DEFICIT DISORDER (ADD) IN ADULT: ICD-10-CM

## 2024-10-28 RX ORDER — DEXTROAMPHETAMINE SACCHARATE, AMPHETAMINE ASPARTATE MONOHYDRATE, DEXTROAMPHETAMINE SULFATE AND AMPHETAMINE SULFATE 5; 5; 5; 5 MG/1; MG/1; MG/1; MG/1
20 CAPSULE, EXTENDED RELEASE ORAL EVERY MORNING
Qty: 30 CAPSULE | Refills: 0 | Status: SHIPPED | OUTPATIENT
Start: 2024-10-28

## 2024-10-30 ENCOUNTER — TELEPHONE (OUTPATIENT)
Dept: FAMILY MEDICINE | Facility: CLINIC | Age: 24
End: 2024-10-30
Payer: COMMERCIAL

## 2024-10-30 DIAGNOSIS — F98.8 ATTENTION DEFICIT DISORDER (ADD) IN ADULT: ICD-10-CM

## 2024-10-30 RX ORDER — DEXTROAMPHETAMINE SACCHARATE, AMPHETAMINE ASPARTATE MONOHYDRATE, DEXTROAMPHETAMINE SULFATE AND AMPHETAMINE SULFATE 5; 5; 5; 5 MG/1; MG/1; MG/1; MG/1
20 CAPSULE, EXTENDED RELEASE ORAL EVERY MORNING
Qty: 30 CAPSULE | Refills: 0 | Status: CANCELLED | OUTPATIENT
Start: 2024-10-30

## 2024-10-31 ENCOUNTER — PATIENT MESSAGE (OUTPATIENT)
Dept: FAMILY MEDICINE | Facility: CLINIC | Age: 24
End: 2024-10-31
Payer: COMMERCIAL

## 2024-12-03 DIAGNOSIS — F98.8 ATTENTION DEFICIT DISORDER (ADD) IN ADULT: ICD-10-CM

## 2024-12-03 RX ORDER — DEXTROAMPHETAMINE SACCHARATE, AMPHETAMINE ASPARTATE MONOHYDRATE, DEXTROAMPHETAMINE SULFATE AND AMPHETAMINE SULFATE 5; 5; 5; 5 MG/1; MG/1; MG/1; MG/1
20 CAPSULE, EXTENDED RELEASE ORAL EVERY MORNING
Qty: 30 CAPSULE | Refills: 0 | Status: SHIPPED | OUTPATIENT
Start: 2024-12-03

## 2024-12-03 NOTE — TELEPHONE ENCOUNTER
No care due was identified.  [facility] Embedded Care Due Messages. Reference number: [id number].   12/03/2024 11:42:26 AM CST

## 2024-12-09 ENCOUNTER — TELEPHONE (OUTPATIENT)
Dept: FAMILY MEDICINE | Facility: CLINIC | Age: 24
End: 2024-12-09
Payer: COMMERCIAL

## 2024-12-09 NOTE — TELEPHONE ENCOUNTER
----- Message from Terrell sent at 12/9/2024 10:05 AM CST -----  Regarding: pharmacy  Contact: [facility]  Type:  Pharmacy Calling to Clarify an RX    Name of Caller:  [facility] #[id number] - [place] - [address]  [place] [address]  Phone: [phone] Fax: [phone]  Pharmacy Name:  Prescription Name:dextroamphetamine-amphetamine (ADDERALL XR) 20 MG 24 hr capsule  What do they need to clarify?:DAQUAN is not approved by medicaid/ approved code: F90.0 or similar   Best Call Back Number:  Additional Information: please call

## 2025-01-15 DIAGNOSIS — F98.8 ATTENTION DEFICIT DISORDER (ADD) IN ADULT: ICD-10-CM

## 2025-01-16 RX ORDER — DEXTROAMPHETAMINE SACCHARATE, AMPHETAMINE ASPARTATE MONOHYDRATE, DEXTROAMPHETAMINE SULFATE AND AMPHETAMINE SULFATE 5; 5; 5; 5 MG/1; MG/1; MG/1; MG/1
20 CAPSULE, EXTENDED RELEASE ORAL EVERY MORNING
Qty: 30 CAPSULE | Refills: 0 | Status: SHIPPED | OUTPATIENT
Start: 2025-01-16

## 2025-01-16 NOTE — TELEPHONE ENCOUNTER
Please approve for dextroamphetamine-amphetamine (ADDERALL XR) 20 MG 24 hr capsule     Last OV 09/16/24  Next appt 01/30/25

## 2025-01-16 NOTE — TELEPHONE ENCOUNTER
No care due was identified.  [facility] Embedded Care Due Messages. Reference number: [id number].   1/15/2025 8:32:29 PM CST

## 2025-02-21 DIAGNOSIS — F98.8 ATTENTION DEFICIT DISORDER (ADD) IN ADULT: ICD-10-CM

## 2025-02-21 NOTE — TELEPHONE ENCOUNTER
No care due was identified.  [facility] Embedded Care Due Messages. Reference number: [id number].   2/21/2025 11:51:39 AM CST

## 2025-02-24 RX ORDER — DEXTROAMPHETAMINE SACCHARATE, AMPHETAMINE ASPARTATE MONOHYDRATE, DEXTROAMPHETAMINE SULFATE AND AMPHETAMINE SULFATE 5; 5; 5; 5 MG/1; MG/1; MG/1; MG/1
20 CAPSULE, EXTENDED RELEASE ORAL EVERY MORNING
Qty: 30 CAPSULE | Refills: 0 | Status: SHIPPED | OUTPATIENT
Start: 2025-02-24

## 2025-02-25 ENCOUNTER — TELEPHONE (OUTPATIENT)
Dept: FAMILY MEDICINE | Facility: CLINIC | Age: 25
End: 2025-02-25
Payer: COMMERCIAL

## 2025-02-25 NOTE — TELEPHONE ENCOUNTER
Pt's dx is linked to Rx:  ADD, F98.8    Adderall is FDA approved and should be covered w/ this dx. Pt is not dx ADHD. Called pharm to clarify. They are not yet open. We will have to try again later.

## 2025-02-25 NOTE — TELEPHONE ENCOUNTER
----- Message from Leander sent at 2/24/2025 10:42 AM CST -----  Type:  Pharmacy Calling to Clarify an RXName of Caller:  Luana from pharm Pharmacy Name:  [facility]/pharmacy #[id number] - [place] - [id number] [facility] [address] [address]Aguzk: [phone] Fax: [phone] Name:  dextroamphetamine-amphetamine (ADDERALL XR) 20 MG 24 hr capsuleWhat do they need to clarify?:  They need to get diagnosis code for ADHD and pt's insurance update as they have to call each month and the pt's mother is getting frustrated. dextroamphetamine-amphetamine (ADDERALL XR) 20 MG 24 hr capsule

## 2025-04-01 DIAGNOSIS — F98.8 ATTENTION DEFICIT DISORDER (ADD) IN ADULT: ICD-10-CM

## 2025-04-01 RX ORDER — DEXTROAMPHETAMINE SACCHARATE, AMPHETAMINE ASPARTATE MONOHYDRATE, DEXTROAMPHETAMINE SULFATE AND AMPHETAMINE SULFATE 5; 5; 5; 5 MG/1; MG/1; MG/1; MG/1
20 CAPSULE, EXTENDED RELEASE ORAL EVERY MORNING
Qty: 30 CAPSULE | Refills: 0 | Status: SHIPPED | OUTPATIENT
Start: 2025-04-01

## 2025-04-01 NOTE — TELEPHONE ENCOUNTER
No care due was identified.  [facility] Embedded Care Due Messages. Reference number: [id number].   4/01/2025 6:46:47 AM CDT

## 2025-04-03 ENCOUNTER — TELEPHONE (OUTPATIENT)
Dept: FAMILY MEDICINE | Facility: CLINIC | Age: 25
End: 2025-04-03
Payer: COMMERCIAL

## 2025-04-03 NOTE — TELEPHONE ENCOUNTER
----- Message from Tiffany sent at 4/3/2025  9:09 AM CDT -----  Contact: Elia Palmer  Type:  Pharmacy Calling to Clarify an RXName of Caller:  Pts MotherPh[facility]acy [facility]:  [facility]xtro[facility] (ADDERALL XR) 20 MG 24 hr capsuleWhat do they need to clarify?:  Needs to call to update diagnosis to stated ADD or ADHD and not unspecified diagnosisBest Call Back Number:  [facility] [phone] Information:  Stated Ins won't cover this med without correct diagnosis. Thank You

## 2025-04-25 ENCOUNTER — OFFICE VISIT (OUTPATIENT)
Dept: FAMILY MEDICINE | Facility: CLINIC | Age: 25
End: 2025-04-25
Payer: COMMERCIAL

## 2025-04-25 VITALS
WEIGHT: 243.25 LBS | BODY MASS INDEX: 34.83 KG/M2 | RESPIRATION RATE: 16 BRPM | DIASTOLIC BLOOD PRESSURE: 82 MMHG | SYSTOLIC BLOOD PRESSURE: 138 MMHG | OXYGEN SATURATION: 99 % | TEMPERATURE: 99 F | HEART RATE: 103 BPM | HEIGHT: 70 IN

## 2025-04-25 DIAGNOSIS — Z00.00 WELLNESS EXAMINATION: ICD-10-CM

## 2025-04-25 DIAGNOSIS — F98.8 ATTENTION DEFICIT DISORDER (ADD) IN ADULT: ICD-10-CM

## 2025-04-25 DIAGNOSIS — L21.9 SEBORRHEIC DERMATITIS: Primary | ICD-10-CM

## 2025-04-25 PROCEDURE — 99999 PR PBB SHADOW E&M-EST. PATIENT-LVL III: CPT | Mod: PBBFAC,,, | Performed by: FAMILY MEDICINE

## 2025-04-25 RX ORDER — DEXTROAMPHETAMINE SACCHARATE, AMPHETAMINE ASPARTATE MONOHYDRATE, DEXTROAMPHETAMINE SULFATE AND AMPHETAMINE SULFATE 5; 5; 5; 5 MG/1; MG/1; MG/1; MG/1
20 CAPSULE, EXTENDED RELEASE ORAL EVERY MORNING
Qty: 30 CAPSULE | Refills: 0 | Status: SHIPPED | OUTPATIENT
Start: 2025-04-25

## 2025-04-25 RX ORDER — KETOCONAZOLE 20 MG/ML
SHAMPOO, SUSPENSION TOPICAL
Qty: 120 ML | Refills: 11 | Status: SHIPPED | OUTPATIENT
Start: 2025-04-28

## 2025-04-25 NOTE — PROGRESS NOTES
THIS DOCUMENT WAS MADE IN PART WITH VOICE RECOGNITION SOFTWARE.  OCCASIONALLY THIS SOFTWARE WILL MISINTERPRET WORDS OR PHRASES.    Assessment and Plan:    1. Seborrheic dermatitis  ketoconazole (NIZORAL) 2 % shampoo      2. Attention deficit disorder (ADD) in adult  dextroamphetamine-amphetamine (ADDERALL XR) 20 MG 24 hr capsule      3. Wellness examination  CBC Auto Differential    Comprehensive Metabolic Panel    Lipid Panel    Hemoglobin A1C    TSH    T4, Free    Urinalysis Microscopic    Urinalysis          Assessment & Plan    PLAN SUMMARY:   Ordered standard checkup labs including urine test   Provided refill for ketoconazole shampoo (Nizoral) with multiple refills   Continued ketoconazole shampoo for seborrheic dermatitis management   Provided refill for Adderall medication   Continued Adderall for ADHD management   Carlos Eduardo to maintain healthy lifestyle habits: regular hiking, cooking at home, healthy dietary choices   Carlos Eduardo to limit fast food intake, opt for grilled chicken and salads when eating out   Follow-up in 3 months for ADHD management, with option for virtual or in-person visit    ATTENTION-DEFICIT HYPERACTIVITY DISORDER (ADHD):   Continued Adderall for ADHD management.   Carlos Eduardo is tolerating Adderall well.   Provided refill for Adderall medication.   Confirmed correct diagnosis code for attention deficit disorder in adult for pharmacy.   Instructed the patient to contact the office if there are any issues with the pharmacy filling the Adderall prescription due to diagnosis code.    SEBORRHEIC DERMATITIS:   Continued ketoconazole shampoo for seborrheic dermatitis management.   Carlos Eduardo reports ketoconazole shampoo is effective in reducing itchiness.   Provided refill for ketoconazole shampoo (Nizoral) with multiple refills.    PREVENTIVE CARE AND HEALTH MAINTENANCE:   Educated on importance of maintaining healthy habits, particularly exercise and diet, to prevent future health issues such as HTN, sleep  apnea, diabetes, and high cholesterol.   Carlos Eduardo to continue regular hiking, cooking at home, and maintain healthy dietary choices.   Recommend opting for grilled chicken and salads occasionally.   Carlos Eduardo to limit fast food intake.   Ordered standard checkup labs including urine test.    FOLLOW-UP:   Scheduled follow up in 3 months for ADHD management, with option for virtual or in-person visit.             ______________________________________________________________________  Subjective:    Chief Complaint:  Chief Complaint   Patient presents with    Medication Refill     Needs Dx code for prescription & dandruff shampoo needs refill        HPI:  Carlos Eduardo is a 25 y.o. year old     History of Present Illness    CHIEF COMPLAINT:  Carlos Eduardo presents today for medication follow-up    ADD:  He continues Adderall for ADHD management with good efficacy and denies any side effects.    DERMATOLOGIC:  He reports improvement in seborrheic dermatitis symptoms with regular use of ketoconazole shampoo, noting decreased itchiness. His girlfriend assists with shampoo application during hair maintenance.    ALLERGIES:  He denies use of any allergy medications.    GENERAL:  He reports feeling good overall with improved energy levels.      ROS:  ROS as indicated in HPI.               Past Medical History:  Past Medical History:   Diagnosis Date    Allergy        Past Surgical History:  No past surgical history on file.    Family History:  Family History   Problem Relation Name Age of Onset    Depression Mother Dajuaquin Zpaata     Diabetes Maternal Grandmother Jeffrey Jhaverin     Hypertension Maternal Grandmother Jeffrey Jhaverin     Early death Paternal Grandfather Manuel Jhaverin     Heart disease Paternal Grandfather Manuel Jhaverin     Hypertension Paternal Grandfather Manuel Zapata        Social History:  Social History     Socioeconomic History    Marital status: Single    Number of children: 0   Tobacco Use    Smoking status: Never     Passive exposure:  Never    Smokeless tobacco: Never   Substance and Sexual Activity    Alcohol use: Yes     Alcohol/week: 1.0 standard drink of alcohol     Types: 1 Glasses of wine per week    Drug use: Never    Sexual activity: Yes     Partners: Female     Birth control/protection: Condom, Other-see comments     Comment: Birth Control Pills   Social History Narrative    School : [employer]     Exercise : None    Diet : normal      Social Drivers of Health     Financial Resource Strain: Low Risk  (4/1/2024)    Overall Financial Resource Strain (CARDIA)     Difficulty of Paying Living Expenses: Not hard at all   Food Insecurity: No Food Insecurity (4/1/2024)    Hunger Vital Sign     Worried About Running Out of Food in the Last Year: Never true     Ran Out of Food in the Last Year: Never true   Transportation Needs: No Transportation Needs (4/1/2024)    PRAPARE - Transportation     Lack of Transportation (Medical): No     Lack of Transportation (Non-Medical): No   Physical Activity: Insufficiently Active (4/1/2024)    Exercise Vital Sign     Days of Exercise per Week: 4 days     Minutes of Exercise per Session: 20 min   Stress: Stress Concern Present (4/1/2024)    [employer] - Occupational Stress Questionnaire     Feeling of Stress : To some extent   Housing Stability: Low Risk  (4/1/2024)    Housing Stability Vital Sign     Unable to Pay for Housing in the Last Year: No     Number of Places Lived in the Last Year: 2     Unstable Housing in the Last Year: No       Medications:  Medications Ordered Prior to Encounter[1]    Allergies:  Patient has no known allergies.    Immunizations:  Immunization History   Administered Date(s) Administered    COVID-19, MRNA, LN-S, PF (Pfizer) (Purple Cap) 03/28/2021, 04/18/2021    DTaP 2002, 2002, 2002, 12/09/2003, 05/11/2006    HPV Quadrivalent 02/01/2013, 04/29/2013, 08/07/2013    Hepatitis A, Pediatric/Adolescent, 2 Dose 02/01/2013,  "08/07/2013    Hepatitis B 2002, 2002, 2002    Hib-HbOC 2002, 2002, 2002, 05/09/2003    IPV 2002, 2002, 2002, 05/11/2006    Influenza - Intranasal 11/02/2010    Influenza - Quadrivalent 01/17/2014    Influenza - Quadrivalent - MDCK - PF 09/14/2017    Influenza - Quadrivalent - PF *Preferred* (6 months and older) 10/10/2014, 09/01/2018, 09/14/2019, 10/17/2020, 10/22/2021, 03/03/2023    Influenza - Trivalent (PED) 12/19/2006, 11/08/2007, 11/26/2007, 11/04/2009    Influenza A (H1N1) 2009 Monovalent - IM - PF 11/04/2009, 12/29/2009    MMR 02/19/2003, 05/11/2006    Meningococcal B, OMV 04/19/2018, 05/30/2018    Meningococcal Conjugate (MCV4P) 02/01/2013, 04/19/2018    Pneumococcal Conjugate - 7 Valent 2002, 2002, 2002, 05/09/2003    Tdap 02/01/2013, 04/02/2024    Varicella 02/19/2003, 03/24/2008       Review of Systems:  Review of Systems    Objective:    Vitals:  Vitals:    04/25/25 1128   BP: 138/82   Pulse: 103   Resp: 16   Temp: 99.1 °F (37.3 °C)   TempSrc: Oral   SpO2: 99%   Weight: 110.3 kg (243 lb 4.4 oz)   Height: 5' 9.5" (1.765 m)   PainSc: 0-No pain       Physical Exam        Harry Zuleta MD  Family Medicine           [1]   Current Outpatient Medications on File Prior to Visit   Medication Sig Dispense Refill    [DISCONTINUED] cetirizine (ZYRTEC) 10 MG tablet Take 10 mg by mouth once daily.      [DISCONTINUED] dextroamphetamine-amphetamine (ADDERALL XR) 20 MG 24 hr capsule Take 1 capsule (20 mg total) by mouth every morning. 30 capsule 0    [DISCONTINUED] fexofenadine (ALLEGRA) 180 MG tablet Take 180 mg by mouth once daily.       No current facility-administered medications on file prior to visit.     "

## 2025-05-09 DIAGNOSIS — F98.8 ATTENTION DEFICIT DISORDER (ADD) IN ADULT: ICD-10-CM

## 2025-05-10 NOTE — TELEPHONE ENCOUNTER
No care due was identified.  [facility] Embedded Care Due Messages. Reference number: [id number].   5/09/2025 9:52:33 PM CDT

## 2025-05-12 RX ORDER — DEXTROAMPHETAMINE SACCHARATE, AMPHETAMINE ASPARTATE MONOHYDRATE, DEXTROAMPHETAMINE SULFATE AND AMPHETAMINE SULFATE 5; 5; 5; 5 MG/1; MG/1; MG/1; MG/1
20 CAPSULE, EXTENDED RELEASE ORAL EVERY MORNING
Qty: 30 CAPSULE | Refills: 0 | Status: SHIPPED | OUTPATIENT
Start: 2025-05-23 | End: 2025-05-14 | Stop reason: SDUPTHER

## 2025-05-13 ENCOUNTER — TELEPHONE (OUTPATIENT)
Dept: FAMILY MEDICINE | Facility: CLINIC | Age: 25
End: 2025-05-13
Payer: COMMERCIAL

## 2025-05-13 DIAGNOSIS — F98.8 ATTENTION DEFICIT DISORDER (ADD) IN ADULT: ICD-10-CM

## 2025-05-13 NOTE — TELEPHONE ENCOUNTER
----- Message from Janis sent at 5/13/2025 10:14 AM CDT -----  Contact: Parent  Type:  Needs Medical AdviceWho Called: PatientPharmacy name and phone #:  [id number] [place] [facility] [id number] [address]: [phone] Fax: [phone] the patient rather a call back or a response via [facility]? [facility] Call Back Number: [phone] Information: Please resubmit RX for dextroamphetamine-amphetamine (ADDERALL XR) 20 MG 24 hr capsulePlease do list ADHD w hyperactive disorder.

## 2025-05-14 RX ORDER — DEXTROAMPHETAMINE SACCHARATE, AMPHETAMINE ASPARTATE MONOHYDRATE, DEXTROAMPHETAMINE SULFATE AND AMPHETAMINE SULFATE 5; 5; 5; 5 MG/1; MG/1; MG/1; MG/1
20 CAPSULE, EXTENDED RELEASE ORAL EVERY MORNING
Qty: 30 CAPSULE | Refills: 0 | Status: SHIPPED | OUTPATIENT
Start: 2025-05-23

## 2025-06-13 DIAGNOSIS — F98.8 ATTENTION DEFICIT DISORDER (ADD) IN ADULT: ICD-10-CM

## 2025-06-13 NOTE — TELEPHONE ENCOUNTER
No care due was identified.  [facility] Embedded Care Due Messages. Reference number: [id number].   6/13/2025 11:45:34 AM CDT

## 2025-06-16 RX ORDER — DEXTROAMPHETAMINE SACCHARATE, AMPHETAMINE ASPARTATE MONOHYDRATE, DEXTROAMPHETAMINE SULFATE AND AMPHETAMINE SULFATE 5; 5; 5; 5 MG/1; MG/1; MG/1; MG/1
20 CAPSULE, EXTENDED RELEASE ORAL EVERY MORNING
Qty: 30 CAPSULE | Refills: 0 | Status: SHIPPED | OUTPATIENT
Start: 2025-06-16 | End: 2025-06-19 | Stop reason: SDUPTHER

## 2025-06-18 ENCOUNTER — TELEPHONE (OUTPATIENT)
Dept: FAMILY MEDICINE | Facility: CLINIC | Age: 25
End: 2025-06-18
Payer: COMMERCIAL

## 2025-06-18 DIAGNOSIS — F98.8 ATTENTION DEFICIT DISORDER (ADD) IN ADULT: ICD-10-CM

## 2025-06-18 NOTE — TELEPHONE ENCOUNTER
Copied from CRM #[id number]. Topic: General Inquiry - Patient Advice  >> Jun 18, 2025  8:58 AM Keisha wrote:  Type:  Needs Medical Advice    Who Called: pt mom  Pharmacy name and phone #:  pt mom  Would the patient rather a call back or a response via [facility]? call  Best Call Back Number: Telephone Information:  Mobile          [phone]    [id number] [place] [facility] [address]  [address]  [place] [address]  Phone: [phone] Fax: [phone]    Additional Information: pt mom called regarding her sons medication pt mom states that they have been having ongoing trouble with the pharmacy pt mom states the the medication needs to reflect that the mediaction is being prescribed for hyper active disorder or ADHD it cannot reflect other reasons or Medicaed will not cover it  [facility] pt also states that her son only has 1 pill left and that they have been dealing with this every month

## 2025-06-18 NOTE — TELEPHONE ENCOUNTER
Can we submit adderall rx with a F code? Medicaid doesn't cover Z codes. Usually has to be F90.2 or F98 I believe

## 2025-06-19 RX ORDER — DEXTROAMPHETAMINE SACCHARATE, AMPHETAMINE ASPARTATE MONOHYDRATE, DEXTROAMPHETAMINE SULFATE AND AMPHETAMINE SULFATE 5; 5; 5; 5 MG/1; MG/1; MG/1; MG/1
20 CAPSULE, EXTENDED RELEASE ORAL EVERY MORNING
Qty: 30 CAPSULE | Refills: 0 | Status: SHIPPED | OUTPATIENT
Start: 2025-06-19

## 2025-06-23 ENCOUNTER — LAB VISIT (OUTPATIENT)
Dept: LAB | Facility: HOSPITAL | Age: 25
End: 2025-06-23
Payer: COMMERCIAL

## 2025-06-23 DIAGNOSIS — Z00.00 WELLNESS EXAMINATION: ICD-10-CM

## 2025-06-23 LAB
ABSOLUTE EOSINOPHIL (OHS): 0.05 K/UL
ABSOLUTE MONOCYTE (OHS): 0.39 K/UL (ref 0.3–1)
ABSOLUTE NEUTROPHIL COUNT (OHS): 3.26 K/UL (ref 1.8–7.7)
ALBUMIN SERPL BCP-MCNC: 4 G/DL (ref 3.5–5.2)
ALP SERPL-CCNC: 119 UNIT/L (ref 40–150)
ALT SERPL W/O P-5'-P-CCNC: 35 UNIT/L (ref 10–44)
ANION GAP (OHS): 8 MMOL/L (ref 8–16)
AST SERPL-CCNC: 21 UNIT/L (ref 11–45)
BASOPHILS # BLD AUTO: 0.03 K/UL
BASOPHILS NFR BLD AUTO: 0.5 %
BILIRUB SERPL-MCNC: 0.6 MG/DL (ref 0.1–1)
BUN SERPL-MCNC: 10 MG/DL (ref 6–20)
CALCIUM SERPL-MCNC: 9.7 MG/DL (ref 8.7–10.5)
CHLORIDE SERPL-SCNC: 105 MMOL/L (ref 95–110)
CHOLEST SERPL-MCNC: 160 MG/DL (ref 120–199)
CHOLEST/HDLC SERPL: 4.8 {RATIO} (ref 2–5)
CO2 SERPL-SCNC: 25 MMOL/L (ref 23–29)
CREAT SERPL-MCNC: 0.7 MG/DL (ref 0.5–1.4)
EAG (OHS): 105 MG/DL (ref 68–131)
ERYTHROCYTE [DISTWIDTH] IN BLOOD BY AUTOMATED COUNT: 13.3 % (ref 11.5–14.5)
GFR SERPLBLD CREATININE-BSD FMLA CKD-EPI: >60 ML/MIN/1.73/M2
GLUCOSE SERPL-MCNC: 85 MG/DL (ref 70–110)
HBA1C MFR BLD: 5.3 % (ref 4–5.6)
HCT VFR BLD AUTO: 44.9 % (ref 40–54)
HDLC SERPL-MCNC: 33 MG/DL (ref 40–75)
HDLC SERPL: 20.6 % (ref 20–50)
HGB BLD-MCNC: 14.3 GM/DL (ref 14–18)
IMM GRANULOCYTES # BLD AUTO: 0.01 K/UL (ref 0–0.04)
IMM GRANULOCYTES NFR BLD AUTO: 0.2 % (ref 0–0.5)
LDLC SERPL CALC-MCNC: 112.2 MG/DL (ref 63–159)
LYMPHOCYTES # BLD AUTO: 2.17 K/UL (ref 1–4.8)
MCH RBC QN AUTO: 27 PG (ref 27–31)
MCHC RBC AUTO-ENTMCNC: 31.8 G/DL (ref 32–36)
MCV RBC AUTO: 85 FL (ref 82–98)
NONHDLC SERPL-MCNC: 127 MG/DL
NUCLEATED RBC (/100WBC) (OHS): 0 /100 WBC
PLATELET # BLD AUTO: 365 K/UL (ref 150–450)
PMV BLD AUTO: 11.4 FL (ref 9.2–12.9)
POTASSIUM SERPL-SCNC: 4.2 MMOL/L (ref 3.5–5.1)
PROT SERPL-MCNC: 7.2 GM/DL (ref 6–8.4)
RBC # BLD AUTO: 5.29 M/UL (ref 4.6–6.2)
RELATIVE EOSINOPHIL (OHS): 0.8 %
RELATIVE LYMPHOCYTE (OHS): 36.7 % (ref 18–48)
RELATIVE MONOCYTE (OHS): 6.6 % (ref 4–15)
RELATIVE NEUTROPHIL (OHS): 55.2 % (ref 38–73)
SODIUM SERPL-SCNC: 138 MMOL/L (ref 136–145)
T4 FREE SERPL-MCNC: 0.9 NG/DL (ref 0.71–1.51)
TRIGL SERPL-MCNC: 74 MG/DL (ref 30–150)
TSH SERPL-ACNC: 3.01 UIU/ML (ref 0.4–4)
WBC # BLD AUTO: 5.91 K/UL (ref 3.9–12.7)

## 2025-06-23 PROCEDURE — 83036 HEMOGLOBIN GLYCOSYLATED A1C: CPT

## 2025-06-23 PROCEDURE — 80053 COMPREHEN METABOLIC PANEL: CPT

## 2025-06-23 PROCEDURE — 36415 COLL VENOUS BLD VENIPUNCTURE: CPT | Mod: PN

## 2025-06-23 PROCEDURE — 85025 COMPLETE CBC W/AUTO DIFF WBC: CPT

## 2025-06-23 PROCEDURE — 84443 ASSAY THYROID STIM HORMONE: CPT

## 2025-06-23 PROCEDURE — 84439 ASSAY OF FREE THYROXINE: CPT

## 2025-06-23 PROCEDURE — 80061 LIPID PANEL: CPT

## 2025-06-24 ENCOUNTER — RESULTS FOLLOW-UP (OUTPATIENT)
Dept: FAMILY MEDICINE | Facility: CLINIC | Age: 25
End: 2025-06-24

## 2025-07-22 ENCOUNTER — OFFICE VISIT (OUTPATIENT)
Dept: FAMILY MEDICINE | Facility: CLINIC | Age: 25
End: 2025-07-22
Payer: COMMERCIAL

## 2025-07-22 VITALS
TEMPERATURE: 98 F | BODY MASS INDEX: 34.26 KG/M2 | DIASTOLIC BLOOD PRESSURE: 86 MMHG | HEART RATE: 108 BPM | OXYGEN SATURATION: 99 % | WEIGHT: 239.31 LBS | HEIGHT: 70 IN | SYSTOLIC BLOOD PRESSURE: 132 MMHG | RESPIRATION RATE: 16 BRPM

## 2025-07-22 DIAGNOSIS — Z13.39 ADHD (ATTENTION DEFICIT HYPERACTIVITY DISORDER) EVALUATION: Primary | ICD-10-CM

## 2025-07-22 PROCEDURE — 1159F MED LIST DOCD IN RCRD: CPT | Mod: CPTII,S$GLB,, | Performed by: FAMILY MEDICINE

## 2025-07-22 PROCEDURE — 3079F DIAST BP 80-89 MM HG: CPT | Mod: CPTII,S$GLB,, | Performed by: FAMILY MEDICINE

## 2025-07-22 PROCEDURE — 3044F HG A1C LEVEL LT 7.0%: CPT | Mod: CPTII,S$GLB,, | Performed by: FAMILY MEDICINE

## 2025-07-22 PROCEDURE — 99213 OFFICE O/P EST LOW 20 MIN: CPT | Mod: S$GLB,,, | Performed by: FAMILY MEDICINE

## 2025-07-22 PROCEDURE — 3008F BODY MASS INDEX DOCD: CPT | Mod: CPTII,S$GLB,, | Performed by: FAMILY MEDICINE

## 2025-07-22 PROCEDURE — 99999 PR PBB SHADOW E&M-EST. PATIENT-LVL IV: CPT | Mod: PBBFAC,,, | Performed by: FAMILY MEDICINE

## 2025-07-22 PROCEDURE — 3075F SYST BP GE 130 - 139MM HG: CPT | Mod: CPTII,S$GLB,, | Performed by: FAMILY MEDICINE

## 2025-07-22 NOTE — PROGRESS NOTES
THIS DOCUMENT WAS MADE IN PART WITH VOICE RECOGNITION SOFTWARE.  OCCASIONALLY THIS SOFTWARE WILL MISINTERPRET WORDS OR PHRASES.    Assessment and Plan:    1. ADHD (attention deficit hyperactivity disorder) evaluation              Assessment & Plan    Z13.39 ADHD (attention deficit hyperactivity disorder) evaluation    PLAN SUMMARY:  > Continue current exercise routine, including hiking when weather permits  > Maintain current Adderall dosage for ADHD management  > Eat a good breakfast when taking Adderall to mitigate appetite suppression  > Regulate caffeine intake while on Adderall  > Follow up in 3 months, either in-person or virtually, based on patient preference    ADHD (ATTENTION DEFICIT HYPERACTIVITY DISORDER) EVALUATION:  > Assessed effectiveness of ADHD medication (Adderall), noting positive outcomes in study ability and sleep patterns.  > Evaluated potential side effects, including appetite suppression.  > Heart rate slightly elevated but within typical range.  > Continued Adderall at current dose for ADHD management.  > Explained importance of eating a good breakfast when taking Adderall due to potential appetite suppression.  > Discussed need to regulate caffeine intake while on Adderall to avoid excessive heart rate elevation.  > Improvement in cholesterol levels, particularly LDL dropping from borderline to safe range.  > Recent lab results WNL.  > Overall health status is good, with no immediate concerns requiring intervention.  > Carlos Eduardo to continue current exercise routine, including hiking when weather permits.  > Follow up in 3 months, either in-person or virtually, based on patient preference.             ______________________________________________________________________  Subjective:    Chief Complaint:  Chief Complaint   Patient presents with    Follow-up    Medication Refill        HPI:  Carlos Eduardo is a 25 y.o. year old     History of Present Illness    CHIEF COMPLAINT:  Carlos Eduardo presents today for  follow-up of ADHD medication management.    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 Medication duration is appropriate with good sleep onset. He experiences decreased appetite as an expected medication side effect but denies headaches or palpitations.    WEIGHT MANAGEMENT:  He has experienced progressive weight loss over the past year from 269 lbs to 239 lbs, representing a 30-pound reduction. Weight has consistently declined across multiple clinical encounters.    DIET:  He reports drinking caffeine-free tea in the morning.    LABS:  Thyroid hormones (T4, TSH) are normal. A1C is within normal range with no evidence of diabetes or pre-diabetes. Cholesterol profile shows improvement: total cholesterol decreased from 199 to 160, triglycerides reduced from 92 to 74, LDL dropped from 146 to 112, and HDL remained stable.      ROS:  ROS as indicated in HPI.           ADD  Rx : Adderall 20 xr         Past Medical History:  Past Medical History:   Diagnosis Date    Allergy        Past Surgical History:  No past surgical history on file.    Family History:  Family History   Problem Relation Name Age of Onset    Depression Mother Estela Zapata     Diabetes Maternal Grandmother Jeffrey Jhaverin     Hypertension Maternal Grandmother Jeffrey Jhaverin     Early death Paternal Grandfather Manuel Jhaverin     Heart disease Paternal Grandfather Manuel Zapata     Hypertension Paternal Grandfather Manuel Zapata        Social History:  Social History     Socioeconomic History    Marital status: Single    Number of children: 0   Tobacco Use    Smoking status: Never     Passive exposure: Never    Smokeless tobacco: Never   Substance and Sexual Activity    Alcohol use: Yes     Alcohol/week: 1.0 standard drink of alcohol     Types: 1 Glasses of wine per week    Drug use: Never    Sexual activity: Yes     Partners: Female     Birth  control/protection: Condom, Other-see comments     Comment: Birth Control Pills   Social History Narrative    School : [employer]     Exercise : None    Diet : normal      Social Drivers of Health     Financial Resource Strain: Medium Risk (7/22/2025)    Overall Financial Resource Strain (CARDIA)     Difficulty of Paying Living Expenses: Somewhat hard   Food Insecurity: No Food Insecurity (7/22/2025)    Hunger Vital Sign     Worried About Running Out of Food in the Last Year: Never true     Ran Out of Food in the Last Year: Never true   Transportation Needs: No Transportation Needs (7/22/2025)    PRAPARE - Transportation     Lack of Transportation (Medical): No     Lack of Transportation (Non-Medical): No   Physical Activity: Insufficiently Active (7/22/2025)    Exercise Vital Sign     Days of Exercise per Week: 3 days     Minutes of Exercise per Session: 20 min   Stress: Stress Concern Present (7/22/2025)    [employer] [place] [employer] - Occupational Stress Questionnaire     Feeling of Stress : To some extent   Housing Stability: Low Risk  (7/22/2025)    Housing Stability Vital Sign     Unable to Pay for Housing in the Last Year: No     Number of Times Moved in the Last Year: 0     Homeless in the Last Year: No       Medications:  Medications Ordered Prior to Encounter[1]    Allergies:  Patient has no known allergies.    Immunizations:  Immunization History   Administered Date(s) Administered    COVID-19, MRNA, LN-S, PF (Pfizer) (Purple Cap) 03/28/2021, 04/18/2021    DTaP 2002, 2002, 2002, 12/09/2003, 05/11/2006    HPV Quadrivalent 02/01/2013, 04/29/2013, 08/07/2013    Hepatitis A, Pediatric/Adolescent, 2 Dose 02/01/2013, 08/07/2013    Hepatitis B 2002, 2002, 2002    Hib-HbOC 2002, 2002, 2002, 05/09/2003    IPV 2002, 2002, 2002, 05/11/2006    Influenza - Intranasal 11/02/2010    Influenza - Quadrivalent 01/17/2014     "Influenza - Quadrivalent - MDCK - PF 09/14/2017    Influenza - Quadrivalent - PF *Preferred* (6 months and older) 10/10/2014, 09/01/2018, 09/14/2019, 10/17/2020, 10/22/2021, 03/03/2023    Influenza - Trivalent (PED) 12/19/2006, 11/08/2007, 11/26/2007, 11/04/2009    Influenza A (H1N1) 2009 Monovalent - IM - PF 11/04/2009, 12/29/2009    MMR 02/19/2003, 05/11/2006    Meningococcal B, OMV 04/19/2018, 05/30/2018    Meningococcal Conjugate (MCV4P) 02/01/2013, 04/19/2018    Pneumococcal Conjugate - 7 Valent 2002, 2002, 2002, 05/09/2003    Tdap 02/01/2013, 04/02/2024    Varicella 02/19/2003, 03/24/2008       Review of Systems:  Review of Systems   All other systems reviewed and are negative.      Objective:    Vitals:  Vitals:    07/22/25 1058   BP: 132/86   Pulse: 108   Resp: 16   Temp: 98.1 °F (36.7 °C)   TempSrc: Oral   SpO2: 99%   Weight: 108.6 kg (239 lb 5 oz)   Height: 5' 9.5" (1.765 m)   PainSc: 0-No pain       Physical Exam  Vitals reviewed.   Constitutional:       General: He is not in acute distress.  HENT:      Head: Normocephalic and atraumatic.   Eyes:      Pupils: Pupils are equal, round, and reactive to light.   Cardiovascular:      Rate and Rhythm: Normal rate and regular rhythm.      Heart sounds: No murmur heard.     No friction rub.   Pulmonary:      Effort: Pulmonary effort is normal.      Breath sounds: Normal breath sounds.   Abdominal:      General: Bowel sounds are normal. There is no distension.      Palpations: Abdomen is soft.      Tenderness: There is no abdominal tenderness.   Musculoskeletal:      Cervical back: Neck supple.   Skin:     General: Skin is warm and dry.      Findings: No rash.   Psychiatric:         Behavior: Behavior normal.             Harry Zultea MD  Family Medicine               [1]   Current Outpatient Medications on File Prior to Visit   Medication Sig Dispense Refill    dextroamphetamine-amphetamine (ADDERALL XR) 20 MG 24 hr capsule Take 1 capsule (20 mg " total) by mouth every morning. 30 capsule 0    ketoconazole (NIZORAL) 2 % shampoo Apply topically twice a week. 120 mL 11     No current facility-administered medications on file prior to visit.

## 2025-08-25 DIAGNOSIS — F98.8 ATTENTION DEFICIT DISORDER (ADD) IN ADULT: ICD-10-CM

## 2025-08-25 RX ORDER — DEXTROAMPHETAMINE SACCHARATE, AMPHETAMINE ASPARTATE MONOHYDRATE, DEXTROAMPHETAMINE SULFATE AND AMPHETAMINE SULFATE 5; 5; 5; 5 MG/1; MG/1; MG/1; MG/1
20 CAPSULE, EXTENDED RELEASE ORAL EVERY MORNING
Qty: 30 CAPSULE | Refills: 0 | Status: SHIPPED | OUTPATIENT
Start: 2025-08-25

## 2025-08-28 ENCOUNTER — PATIENT MESSAGE (OUTPATIENT)
Dept: FAMILY MEDICINE | Facility: CLINIC | Age: 25
End: 2025-08-28
Payer: COMMERCIAL

## 2025-09-03 ENCOUNTER — PATIENT MESSAGE (OUTPATIENT)
Dept: FAMILY MEDICINE | Facility: CLINIC | Age: 25
End: 2025-09-03
Payer: COMMERCIAL

## 2025-09-04 ENCOUNTER — TELEPHONE (OUTPATIENT)
Dept: FAMILY MEDICINE | Facility: CLINIC | Age: 25
End: 2025-09-04
Payer: COMMERCIAL